# Patient Record
Sex: FEMALE | Race: WHITE
[De-identification: names, ages, dates, MRNs, and addresses within clinical notes are randomized per-mention and may not be internally consistent; named-entity substitution may affect disease eponyms.]

---
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
## 2021-01-28 ENCOUNTER — HOSPITAL ENCOUNTER (OUTPATIENT)
Dept: HOSPITAL 53 - M LABSMTC | Age: 21
End: 2021-01-28
Attending: PEDIATRICS
Payer: SELF-PAY

## 2021-01-28 DIAGNOSIS — Z20.822: Primary | ICD-10-CM

## 2021-02-04 ENCOUNTER — HOSPITAL ENCOUNTER (OUTPATIENT)
Dept: HOSPITAL 53 - M LABSMTC | Age: 21
End: 2021-02-04
Attending: PEDIATRICS
Payer: SELF-PAY

## 2021-02-04 DIAGNOSIS — Z20.822: Primary | ICD-10-CM

## 2022-02-02 ENCOUNTER — HOSPITAL ENCOUNTER (EMERGENCY)
Dept: HOSPITAL 53 - M ED | Age: 22
Discharge: HOME | End: 2022-02-02
Payer: COMMERCIAL

## 2022-02-02 VITALS — DIASTOLIC BLOOD PRESSURE: 78 MMHG | SYSTOLIC BLOOD PRESSURE: 127 MMHG

## 2022-02-02 VITALS — BODY MASS INDEX: 21.41 KG/M2 | WEIGHT: 128.53 LBS | HEIGHT: 65 IN

## 2022-02-02 DIAGNOSIS — K05.10: ICD-10-CM

## 2022-02-02 DIAGNOSIS — K04.7: Primary | ICD-10-CM
